# Patient Record
Sex: MALE | Race: WHITE | NOT HISPANIC OR LATINO | Employment: FULL TIME | URBAN - METROPOLITAN AREA
[De-identification: names, ages, dates, MRNs, and addresses within clinical notes are randomized per-mention and may not be internally consistent; named-entity substitution may affect disease eponyms.]

---

## 2017-09-12 ENCOUNTER — TELEPHONE (OUTPATIENT)
Dept: FAMILY MEDICINE | Facility: CLINIC | Age: 27
End: 2017-09-12

## 2017-09-12 DIAGNOSIS — F39 MOOD DISORDER: ICD-10-CM

## 2017-09-12 RX ORDER — CITALOPRAM 20 MG/1
20 TABLET, FILM COATED ORAL DAILY
Qty: 30 TABLET | Refills: 0 | Status: SHIPPED | OUTPATIENT
Start: 2017-09-12 | End: 2017-10-10 | Stop reason: SDUPTHER

## 2017-09-12 NOTE — TELEPHONE ENCOUNTER
----- Message from Magda Leigh sent at 9/12/2017  9:50 AM CDT -----  Contact: [phone]  Patient requesting a refill on celexa 20mg.    Patient will be using   [id number] [place]  [address] [facility] [address]  [facility] [place] [address]  Phone: [phone] Fax: [phone]    Please call patient at [phone]. Thanks!

## 2017-10-10 DIAGNOSIS — F39 MOOD DISORDER: ICD-10-CM

## 2017-10-10 RX ORDER — CITALOPRAM 20 MG/1
TABLET, FILM COATED ORAL
Qty: 30 TABLET | Refills: 0 | Status: SHIPPED | OUTPATIENT
Start: 2017-10-10 | End: 2017-11-11 | Stop reason: SDUPTHER

## 2017-11-11 DIAGNOSIS — F39 MOOD DISORDER: ICD-10-CM

## 2017-11-13 RX ORDER — CITALOPRAM 20 MG/1
TABLET, FILM COATED ORAL
Qty: 30 TABLET | Refills: 0 | Status: SHIPPED | OUTPATIENT
Start: 2017-11-13 | End: 2017-12-08 | Stop reason: SDUPTHER

## 2017-12-08 DIAGNOSIS — F39 MOOD DISORDER: ICD-10-CM

## 2017-12-08 RX ORDER — CITALOPRAM 20 MG/1
TABLET, FILM COATED ORAL
Qty: 30 TABLET | Refills: 0 | Status: SHIPPED | OUTPATIENT
Start: 2017-12-08

## 2018-01-12 DIAGNOSIS — F39 MOOD DISORDER: ICD-10-CM

## 2018-01-12 RX ORDER — CITALOPRAM 20 MG/1
TABLET, FILM COATED ORAL
Qty: 30 TABLET | Refills: 0 | OUTPATIENT
Start: 2018-01-12

## 2018-01-22 DIAGNOSIS — F39 MOOD DISORDER: ICD-10-CM

## 2018-01-22 RX ORDER — CITALOPRAM 20 MG/1
TABLET, FILM COATED ORAL
Qty: 30 TABLET | Refills: 0 | OUTPATIENT
Start: 2018-01-22

## 2018-02-22 ENCOUNTER — TELEPHONE (OUTPATIENT)
Dept: FAMILY MEDICINE | Facility: CLINIC | Age: 28
End: 2018-02-22

## 2018-02-22 NOTE — TELEPHONE ENCOUNTER
----- Message from Tanya Miranda sent at 2/22/2018  9:55 AM CST -----  Refill on Rx Celexa, patient states that he is out of medication. Please send into [facility]Cat/[phone].  Any questions call [phone]

## 2019-09-25 ENCOUNTER — TELEPHONE (OUTPATIENT)
Dept: FAMILY MEDICINE | Facility: CLINIC | Age: 29
End: 2019-09-25

## 2019-09-25 NOTE — TELEPHONE ENCOUNTER
----- Message from Gaston Espinoza sent at 9/25/2019  9:39 AM CDT -----  Type:  Same Day Appointment Request    Caller is requesting a same day appointment.  Caller declined first available appointment listed below.      Name of Caller:  Patient  When is the first available appointment?  09/30  Symptoms:  Tightness in right leg  Best Call Back Number:  [phone]  Additional Information: New Patient